# Patient Record
Sex: FEMALE | Race: WHITE | NOT HISPANIC OR LATINO
[De-identification: names, ages, dates, MRNs, and addresses within clinical notes are randomized per-mention and may not be internally consistent; named-entity substitution may affect disease eponyms.]

---

## 2018-06-25 ENCOUNTER — TRANSCRIPTION ENCOUNTER (OUTPATIENT)
Age: 33
End: 2018-06-25

## 2019-10-03 DIAGNOSIS — Z30.40 ENCOUNTER FOR SURVEILLANCE OF CONTRACEPTIVES, UNSPECIFIED: ICD-10-CM

## 2019-10-03 PROBLEM — Z00.00 ENCOUNTER FOR PREVENTIVE HEALTH EXAMINATION: Status: ACTIVE | Noted: 2019-10-03

## 2020-01-08 ENCOUNTER — RX RENEWAL (OUTPATIENT)
Age: 35
End: 2020-01-08

## 2020-01-28 ENCOUNTER — APPOINTMENT (OUTPATIENT)
Dept: OBGYN | Facility: CLINIC | Age: 35
End: 2020-01-28
Payer: COMMERCIAL

## 2020-01-28 VITALS
WEIGHT: 177.56 LBS | HEIGHT: 63 IN | DIASTOLIC BLOOD PRESSURE: 70 MMHG | BODY MASS INDEX: 31.46 KG/M2 | SYSTOLIC BLOOD PRESSURE: 120 MMHG

## 2020-01-28 DIAGNOSIS — Z82.49 FAMILY HISTORY OF ISCHEMIC HEART DISEASE AND OTHER DISEASES OF THE CIRCULATORY SYSTEM: ICD-10-CM

## 2020-01-28 DIAGNOSIS — Z78.9 OTHER SPECIFIED HEALTH STATUS: ICD-10-CM

## 2020-01-28 DIAGNOSIS — Z86.39 PERSONAL HISTORY OF OTHER ENDOCRINE, NUTRITIONAL AND METABOLIC DISEASE: ICD-10-CM

## 2020-01-28 DIAGNOSIS — Z83.3 FAMILY HISTORY OF DIABETES MELLITUS: ICD-10-CM

## 2020-01-28 DIAGNOSIS — Z01.419 ENCOUNTER FOR GYNECOLOGICAL EXAMINATION (GENERAL) (ROUTINE) W/OUT ABNORMAL FINDINGS: ICD-10-CM

## 2020-01-28 PROCEDURE — 99395 PREV VISIT EST AGE 18-39: CPT

## 2020-01-28 RX ORDER — CHROMIUM 200 MCG
TABLET ORAL
Refills: 0 | Status: ACTIVE | COMMUNITY

## 2020-01-28 RX ORDER — INSULIN ASPART 100 [IU]/ML
INJECTION, SOLUTION INTRAVENOUS; SUBCUTANEOUS
Refills: 0 | Status: ACTIVE | COMMUNITY

## 2020-01-28 NOTE — HISTORY OF PRESENT ILLNESS
[Frequency: Q ___ days] : menstrual periods occur approximately every [unfilled] days [Menarche Age: ____] : age at menarche was [unfilled] [Sexually Active] : is sexually active [Contraception] : uses contraception [Oral Contraceptives] : uses oral contraceptives [Regular Cycle Intervals] : periods have been regular

## 2020-01-28 NOTE — CHIEF COMPLAINT
[FreeTextEntry1] : The patient presents for a refill of oral contraceptives.  [Annual Visit] : annual visit

## 2020-01-29 LAB — HPV HIGH+LOW RISK DNA PNL CVX: NOT DETECTED

## 2020-02-03 LAB — CYTOLOGY CVX/VAG DOC THIN PREP: NORMAL

## 2020-06-24 RX ORDER — LEVONORGESTREL AND ETHINYL ESTRADIOL 6-5-10
50-30/75-40/ KIT ORAL DAILY
Qty: 1 | Refills: 0 | Status: COMPLETED | COMMUNITY
Start: 2019-10-03 | End: 2020-06-24

## 2020-08-30 ENCOUNTER — TRANSCRIPTION ENCOUNTER (OUTPATIENT)
Age: 35
End: 2020-08-30

## 2020-10-02 ENCOUNTER — APPOINTMENT (OUTPATIENT)
Dept: OBGYN | Facility: CLINIC | Age: 35
End: 2020-10-02
Payer: COMMERCIAL

## 2020-10-02 VITALS
DIASTOLIC BLOOD PRESSURE: 85 MMHG | HEIGHT: 55 IN | BODY MASS INDEX: 41.19 KG/M2 | SYSTOLIC BLOOD PRESSURE: 120 MMHG | WEIGHT: 178 LBS

## 2020-10-02 PROCEDURE — 99214 OFFICE O/P EST MOD 30 MIN: CPT

## 2020-10-02 RX ORDER — METFORMIN HYDROCHLORIDE 625 MG/1
TABLET ORAL
Refills: 0 | Status: DISCONTINUED | COMMUNITY
End: 2020-10-02

## 2020-10-02 NOTE — HISTORY OF PRESENT ILLNESS
[Oral Contraceptive] : uses oral contraception pills [Y] : Patient is sexually active [N] : Patient denies prior pregnancies [Regular Cycle Intervals] : periods have been regular [Frequency: Q ___ days] : menstrual periods occur approximately every [unfilled] days [Menarche Age: ____] : age at menarche was [unfilled]

## 2020-10-02 NOTE — REASON FOR VISIT
[Follow-Up] : a follow-up evaluation of [FreeTextEntry2] : contraception. The patient presents for a refill of oral contraceptives.

## 2020-10-07 RX ORDER — LEVONORGESTREL AND ETHINYL ESTRADIOL 6-5-10
50-30/75-40/ KIT ORAL DAILY
Qty: 1 | Refills: 0 | Status: COMPLETED | COMMUNITY
Start: 2020-01-28 | End: 2020-10-07

## 2020-12-09 ENCOUNTER — RX RENEWAL (OUTPATIENT)
Age: 35
End: 2020-12-09

## 2020-12-23 PROBLEM — Z01.419 ENCOUNTER FOR GYNECOLOGICAL EXAMINATION: Status: RESOLVED | Noted: 2020-01-28 | Resolved: 2020-12-23

## 2021-02-26 ENCOUNTER — RX RENEWAL (OUTPATIENT)
Age: 36
End: 2021-02-26

## 2021-05-29 ENCOUNTER — RX RENEWAL (OUTPATIENT)
Age: 36
End: 2021-05-29

## 2021-06-22 ENCOUNTER — RX CHANGE (OUTPATIENT)
Age: 36
End: 2021-06-22

## 2021-07-09 ENCOUNTER — APPOINTMENT (OUTPATIENT)
Dept: OBGYN | Facility: CLINIC | Age: 36
End: 2021-07-09
Payer: COMMERCIAL

## 2021-07-09 VITALS
HEIGHT: 62 IN | BODY MASS INDEX: 34.49 KG/M2 | SYSTOLIC BLOOD PRESSURE: 126 MMHG | WEIGHT: 187.44 LBS | DIASTOLIC BLOOD PRESSURE: 80 MMHG

## 2021-07-09 DIAGNOSIS — Z01.419 ENCOUNTER FOR GYNECOLOGICAL EXAMINATION (GENERAL) (ROUTINE) W/OUT ABNORMAL FINDINGS: ICD-10-CM

## 2021-07-09 PROCEDURE — 99395 PREV VISIT EST AGE 18-39: CPT

## 2021-07-09 PROCEDURE — 99072 ADDL SUPL MATRL&STAF TM PHE: CPT

## 2021-07-09 NOTE — HISTORY OF PRESENT ILLNESS
[Oral Contraceptive] : uses oral contraception pills [Y] : Patient is sexually active [Frequency: Q ___ days] : menstrual periods occur approximately every [unfilled] days [Menarche Age: ____] : age at menarche was [unfilled] [Regular Cycle Intervals] : periods have been regular [PGHxTotal] : 0 [PGHxFullTerm] : 0 [PGHxPremature] : 0 [PGHxAbortions] : 0 [facility] [PGHxABInduced] : 0 [PGHxABSpont] : 0 [PGHxEctopic] : 0 [PGHxMultBirths] : 0

## 2021-07-12 LAB — HPV HIGH+LOW RISK DNA PNL CVX: NOT DETECTED

## 2021-07-14 LAB — CYTOLOGY CVX/VAG DOC THIN PREP: NORMAL

## 2022-01-05 RX ORDER — LEVONORGESTREL AND ETHINYL ESTRADIOL 6-5-10
50-30/75-40/ KIT ORAL DAILY
Qty: 1 | Refills: 0 | Status: COMPLETED | COMMUNITY
Start: 2020-10-02 | End: 2022-01-05

## 2022-01-14 ENCOUNTER — APPOINTMENT (OUTPATIENT)
Dept: OBGYN | Facility: CLINIC | Age: 37
End: 2022-01-14
Payer: COMMERCIAL

## 2022-01-14 VITALS
DIASTOLIC BLOOD PRESSURE: 78 MMHG | HEIGHT: 62 IN | WEIGHT: 181.13 LBS | SYSTOLIC BLOOD PRESSURE: 128 MMHG | BODY MASS INDEX: 33.33 KG/M2

## 2022-01-14 DIAGNOSIS — Z30.41 ENCOUNTER FOR SURVEILLANCE OF CONTRACEPTIVE PILLS: ICD-10-CM

## 2022-01-14 PROCEDURE — 99213 OFFICE O/P EST LOW 20 MIN: CPT

## 2022-01-14 NOTE — HISTORY OF PRESENT ILLNESS
[Y] : Patient is sexually active [Frequency: Q ___ days] : menstrual periods occur approximately every [unfilled] days [Menarche Age: ____] : age at menarche was [unfilled] [PGHxTotal] : 0 [PGHxFullTerm] : 0 [PGHxPremature] : 0 [PGHxAbortions] : 0 [[facility] : [facility] [PGHxABInduced] : 0 [PGHxABSpont] : 0 [PGHxEctopic] : 0 [PGHxMultBirths] : 0 [Regular Cycle Intervals] : periods have been regular

## 2022-03-09 ENCOUNTER — APPOINTMENT (OUTPATIENT)
Dept: OBGYN | Facility: CLINIC | Age: 37
End: 2022-03-09
Payer: COMMERCIAL

## 2022-03-09 VITALS
DIASTOLIC BLOOD PRESSURE: 83 MMHG | BODY MASS INDEX: 33.13 KG/M2 | HEIGHT: 62 IN | WEIGHT: 180 LBS | SYSTOLIC BLOOD PRESSURE: 156 MMHG

## 2022-03-09 DIAGNOSIS — Z32.01 ENCOUNTER FOR PREGNANCY TEST, RESULT POSITIVE: ICD-10-CM

## 2022-03-09 PROCEDURE — 99213 OFFICE O/P EST LOW 20 MIN: CPT

## 2022-03-09 PROCEDURE — 81025 URINE PREGNANCY TEST: CPT

## 2022-03-10 ENCOUNTER — APPOINTMENT (OUTPATIENT)
Dept: OBGYN | Facility: CLINIC | Age: 37
End: 2022-03-10

## 2022-03-10 LAB
ABO + RH PNL BLD: NORMAL
HCG SERPL-MCNC: 26 MIU/ML
HCG UR QL: POSITIVE
QUALITY CONTROL: YES

## 2022-03-11 ENCOUNTER — APPOINTMENT (OUTPATIENT)
Dept: OBGYN | Facility: CLINIC | Age: 37
End: 2022-03-11

## 2022-03-14 RX ORDER — LEVONORGESTREL AND ETHINYL ESTRADIOL 6-5-10
50-30/75-40/ KIT ORAL
Qty: 3 | Refills: 0 | Status: DISCONTINUED | COMMUNITY
Start: 2022-01-14 | End: 2022-03-14

## 2022-03-14 RX ORDER — LEVONORGESTREL AND ETHINYL ESTRADIOL 6-5-10
50-30/75-40/ KIT ORAL DAILY
Qty: 3 | Refills: 0 | Status: DISCONTINUED | COMMUNITY
Start: 2021-07-09 | End: 2022-03-14

## 2022-03-16 ENCOUNTER — APPOINTMENT (OUTPATIENT)
Dept: OBGYN | Facility: CLINIC | Age: 37
End: 2022-03-16
Payer: COMMERCIAL

## 2022-03-16 VITALS
WEIGHT: 185.13 LBS | SYSTOLIC BLOOD PRESSURE: 120 MMHG | BODY MASS INDEX: 34.07 KG/M2 | DIASTOLIC BLOOD PRESSURE: 80 MMHG | HEIGHT: 62 IN

## 2022-03-16 DIAGNOSIS — O03.9 COMPLETE OR UNSPECIFIED SPONTANEOUS ABORTION W/OUT COMPLICATION: ICD-10-CM

## 2022-03-16 LAB — HCG SERPL-MCNC: 32 MIU/ML

## 2022-03-16 PROCEDURE — 99212 OFFICE O/P EST SF 10 MIN: CPT

## 2022-03-16 NOTE — PHYSICAL EXAM
[Awake] : awake [Alert] : alert [Acute Distress] : no acute distress [Soft] : soft [Tender] : non tender [Oriented x3] : oriented to person, place, and time [Normal] : uterus [Moderate] : there was moderate vaginal bleeding [Dilated] : the cervix was not dilated [Uterine Adnexae] : were not tender and not enlarged

## 2022-03-18 ENCOUNTER — APPOINTMENT (OUTPATIENT)
Dept: OBGYN | Facility: CLINIC | Age: 37
End: 2022-03-18

## 2022-03-18 LAB — HCG SERPL-MCNC: 79 MIU/ML

## 2022-03-24 ENCOUNTER — APPOINTMENT (OUTPATIENT)
Dept: OBGYN | Facility: CLINIC | Age: 37
End: 2022-03-24
Payer: COMMERCIAL

## 2022-03-24 VITALS
DIASTOLIC BLOOD PRESSURE: 88 MMHG | SYSTOLIC BLOOD PRESSURE: 128 MMHG | WEIGHT: 192 LBS | HEIGHT: 62 IN | BODY MASS INDEX: 35.33 KG/M2

## 2022-03-24 DIAGNOSIS — O02.81 INAPPROPRIATE CHANGE IN QUANTITATIVE HUMAN CHORIONIC GONADOTROPIN (HCG) IN EARLY PREGNANCY: ICD-10-CM

## 2022-03-24 PROCEDURE — 99213 OFFICE O/P EST LOW 20 MIN: CPT

## 2022-03-26 ENCOUNTER — TRANSCRIPTION ENCOUNTER (OUTPATIENT)
Age: 37
End: 2022-03-26

## 2022-03-29 ENCOUNTER — EMERGENCY (EMERGENCY)
Facility: HOSPITAL | Age: 37
LOS: 1 days | Discharge: HOME | End: 2022-03-29
Attending: EMERGENCY MEDICINE
Payer: COMMERCIAL

## 2022-03-29 ENCOUNTER — APPOINTMENT (OUTPATIENT)
Dept: ANTEPARTUM | Facility: CLINIC | Age: 37
End: 2022-03-29
Payer: COMMERCIAL

## 2022-03-29 ENCOUNTER — ASOB RESULT (OUTPATIENT)
Age: 37
End: 2022-03-29

## 2022-03-29 ENCOUNTER — APPOINTMENT (OUTPATIENT)
Dept: OBGYN | Facility: CLINIC | Age: 37
End: 2022-03-29
Payer: COMMERCIAL

## 2022-03-29 VITALS
SYSTOLIC BLOOD PRESSURE: 141 MMHG | RESPIRATION RATE: 16 BRPM | HEART RATE: 69 BPM | DIASTOLIC BLOOD PRESSURE: 83 MMHG | TEMPERATURE: 98 F | OXYGEN SATURATION: 97 %

## 2022-03-29 VITALS
WEIGHT: 192 LBS | HEIGHT: 62 IN | DIASTOLIC BLOOD PRESSURE: 76 MMHG | SYSTOLIC BLOOD PRESSURE: 138 MMHG | BODY MASS INDEX: 35.33 KG/M2

## 2022-03-29 VITALS
WEIGHT: 190.04 LBS | DIASTOLIC BLOOD PRESSURE: 87 MMHG | HEIGHT: 62 IN | OXYGEN SATURATION: 99 % | SYSTOLIC BLOOD PRESSURE: 151 MMHG | TEMPERATURE: 98 F | RESPIRATION RATE: 16 BRPM | HEART RATE: 73 BPM

## 2022-03-29 DIAGNOSIS — O00.90 UNSPECIFIED. ECTOPIC. PREGNANCY WITHOUT INTRAUTERINE PREGNANCY: ICD-10-CM

## 2022-03-29 LAB
ALBUMIN SERPL ELPH-MCNC: 4.4 G/DL — SIGNIFICANT CHANGE UP (ref 3.3–5.2)
ALP SERPL-CCNC: 89 U/L — SIGNIFICANT CHANGE UP (ref 40–120)
ALT FLD-CCNC: 15 U/L — SIGNIFICANT CHANGE UP
ANION GAP SERPL CALC-SCNC: 14 MMOL/L — SIGNIFICANT CHANGE UP (ref 5–17)
AST SERPL-CCNC: 18 U/L — SIGNIFICANT CHANGE UP
BASOPHILS # BLD AUTO: 0.06 K/UL — SIGNIFICANT CHANGE UP (ref 0–0.2)
BASOPHILS NFR BLD AUTO: 0.5 % — SIGNIFICANT CHANGE UP (ref 0–2)
BILIRUB SERPL-MCNC: 0.4 MG/DL — SIGNIFICANT CHANGE UP (ref 0.4–2)
BLD GP AB SCN SERPL QL: SIGNIFICANT CHANGE UP
BUN SERPL-MCNC: 9.7 MG/DL — SIGNIFICANT CHANGE UP (ref 8–20)
CALCIUM SERPL-MCNC: 9 MG/DL — SIGNIFICANT CHANGE UP (ref 8.6–10.2)
CHLORIDE SERPL-SCNC: 104 MMOL/L — SIGNIFICANT CHANGE UP (ref 98–107)
CO2 SERPL-SCNC: 22 MMOL/L — SIGNIFICANT CHANGE UP (ref 22–29)
CREAT SERPL-MCNC: 0.73 MG/DL — SIGNIFICANT CHANGE UP (ref 0.5–1.3)
EGFR: 110 ML/MIN/1.73M2 — SIGNIFICANT CHANGE UP
EOSINOPHIL # BLD AUTO: 0.37 K/UL — SIGNIFICANT CHANGE UP (ref 0–0.5)
EOSINOPHIL NFR BLD AUTO: 3.3 % — SIGNIFICANT CHANGE UP (ref 0–6)
GLUCOSE SERPL-MCNC: 133 MG/DL — HIGH (ref 70–99)
HCG SERPL-ACNC: 851.6 MIU/ML — HIGH
HCG SERPL-MCNC: 411 MIU/ML
HCG SERPL-MCNC: 601 MIU/ML
HCT VFR BLD CALC: 40.6 % — SIGNIFICANT CHANGE UP (ref 34.5–45)
HGB BLD-MCNC: 13.6 G/DL — SIGNIFICANT CHANGE UP (ref 11.5–15.5)
IMM GRANULOCYTES NFR BLD AUTO: 0.5 % — SIGNIFICANT CHANGE UP (ref 0–1.5)
LYMPHOCYTES # BLD AUTO: 3.4 K/UL — HIGH (ref 1–3.3)
LYMPHOCYTES # BLD AUTO: 30.6 % — SIGNIFICANT CHANGE UP (ref 13–44)
MCHC RBC-ENTMCNC: 29.9 PG — SIGNIFICANT CHANGE UP (ref 27–34)
MCHC RBC-ENTMCNC: 33.5 GM/DL — SIGNIFICANT CHANGE UP (ref 32–36)
MCV RBC AUTO: 89.2 FL — SIGNIFICANT CHANGE UP (ref 80–100)
MONOCYTES # BLD AUTO: 1.04 K/UL — HIGH (ref 0–0.9)
MONOCYTES NFR BLD AUTO: 9.4 % — SIGNIFICANT CHANGE UP (ref 2–14)
NEUTROPHILS # BLD AUTO: 6.18 K/UL — SIGNIFICANT CHANGE UP (ref 1.8–7.4)
NEUTROPHILS NFR BLD AUTO: 55.7 % — SIGNIFICANT CHANGE UP (ref 43–77)
PLATELET # BLD AUTO: 236 K/UL — SIGNIFICANT CHANGE UP (ref 150–400)
POTASSIUM SERPL-MCNC: 4.2 MMOL/L — SIGNIFICANT CHANGE UP (ref 3.5–5.3)
POTASSIUM SERPL-SCNC: 4.2 MMOL/L — SIGNIFICANT CHANGE UP (ref 3.5–5.3)
PROT SERPL-MCNC: 7.1 G/DL — SIGNIFICANT CHANGE UP (ref 6.6–8.7)
RBC # BLD: 4.55 M/UL — SIGNIFICANT CHANGE UP (ref 3.8–5.2)
RBC # FLD: 12 % — SIGNIFICANT CHANGE UP (ref 10.3–14.5)
SODIUM SERPL-SCNC: 139 MMOL/L — SIGNIFICANT CHANGE UP (ref 135–145)
WBC # BLD: 11.11 K/UL — HIGH (ref 3.8–10.5)
WBC # FLD AUTO: 11.11 K/UL — HIGH (ref 3.8–10.5)

## 2022-03-29 PROCEDURE — 76817 TRANSVAGINAL US OBSTETRIC: CPT

## 2022-03-29 PROCEDURE — 76801 OB US < 14 WKS SINGLE FETUS: CPT

## 2022-03-29 PROCEDURE — 86850 RBC ANTIBODY SCREEN: CPT

## 2022-03-29 PROCEDURE — 85025 COMPLETE CBC W/AUTO DIFF WBC: CPT

## 2022-03-29 PROCEDURE — 99285 EMERGENCY DEPT VISIT HI MDM: CPT

## 2022-03-29 PROCEDURE — 76801 OB US < 14 WKS SINGLE FETUS: CPT | Mod: 26

## 2022-03-29 PROCEDURE — 99284 EMERGENCY DEPT VISIT MOD MDM: CPT | Mod: 25

## 2022-03-29 PROCEDURE — 99213 OFFICE O/P EST LOW 20 MIN: CPT

## 2022-03-29 PROCEDURE — 86900 BLOOD TYPING SEROLOGIC ABO: CPT

## 2022-03-29 PROCEDURE — 76817 TRANSVAGINAL US OBSTETRIC: CPT | Mod: 26

## 2022-03-29 PROCEDURE — 36415 COLL VENOUS BLD VENIPUNCTURE: CPT

## 2022-03-29 PROCEDURE — 84702 CHORIONIC GONADOTROPIN TEST: CPT

## 2022-03-29 PROCEDURE — 86901 BLOOD TYPING SEROLOGIC RH(D): CPT

## 2022-03-29 PROCEDURE — 96372 THER/PROPH/DIAG INJ SC/IM: CPT

## 2022-03-29 PROCEDURE — 80053 COMPREHEN METABOLIC PANEL: CPT

## 2022-03-29 RX ORDER — METHOTREXATE 2.5 MG/1
92 TABLET ORAL ONCE
Refills: 0 | Status: COMPLETED | OUTPATIENT
Start: 2022-03-29 | End: 2022-03-29

## 2022-03-29 RX ORDER — METHOTREXATE 2.5 MG/1
94 TABLET ORAL ONCE
Refills: 0 | Status: DISCONTINUED | OUTPATIENT
Start: 2022-03-29 | End: 2022-03-29

## 2022-03-29 RX ADMIN — METHOTREXATE 92 MILLIGRAM(S): 2.5 TABLET ORAL at 22:44

## 2022-03-29 NOTE — ED ADULT NURSE NOTE - OBJECTIVE STATEMENT
Patient A&Ox4 sent to ED by OBGYN for ectopic pregnancy. Reports vaginal bleeding x 1 week.  Pt 7 weeks pregnant, requesting Methotrexate.  NAD noted, respirations even and unlabored.  Safety precautions in place.  Plan of care explained, pt verbalized understanding.

## 2022-03-29 NOTE — ED STATDOCS - NSFOLLOWUPINSTRUCTIONS_ED_ALL_ED_FT
please follow up with obgyn outpatient      [facility]uese[facility]SpanishTagalog[facility]namese                                                                                                                           Ectopic Pregnancy       An ectopic pregnancy happens when a fertilized egg attaches (implants) outside the uterus. In a normal pregnancy, a fertilized egg implants in the uterus. An ectopic pregnancy cannot develop into a healthy baby. Most ectopic pregnancies occur in one of the fallopian tubes, which is where an egg travels from an ovary to get to the uterus. This is called a tubal pregnancy. An ectopic pregnancy can also happen on an ovary, on the cervix, or in the abdomen.    When a fertilized egg implants on tissue outside the uterus and begins to grow, it may cause the tissue to tear or burst. This is known as a ruptured ectopic pregnancy. The tear or burst causes internal bleeding. This may cause intense pain in the abdomen. An ectopic pregnancy is a medical emergency and can be life-threatening.      What are the causes?    The most common cause of this condition is damage to one of the fallopian tubes. A fallopian tube may be narrowed or blocked, and that stops the fertilized egg from reaching the uterus.    Sometimes, the cause of this condition is not known.      What increases the risk?    The following factors may make you more likely to develop this condition:  •Having gone through infertility treatment before.      •Having had an ectopic pregnancy before.      •Having had surgery to have the fallopian tubes tied.      •Becoming pregnant while using an intrauterine device for birth control.      •Taking birth control pills before the age of 16.      Other risk factors include:  •Smoking.      •Alcohol use.      •History of FERNANDO exposure. FERNANDO is a medicine that was used until 1971 and affected babies whose mothers took the medicine.        What are the signs or symptoms?    Common symptoms of this condition include:  •Missing a menstrual period.      •Nausea or tiredness.      •Tender breasts.      •Other normal pregnancy symptoms.      Other symptoms may include:  •Pain during sex.      •Vaginal bleeding or spotting.      •Cramping or pain in the lower abdomen.      •A fast heartbeat, low blood pressure, and sweating.      •Pain or increased pressure while having a bowel movement.      Symptoms of a ruptured ectopic pregnancy and internal bleeding may include:  •Sudden, severe pain in the abdomen.      •Dizziness, weakness, feeling light-headed, or fainting.      •Pain in the shoulder or neck area.        How is this diagnosed?    This condition is diagnosed by:  •A blood test to check for the pregnancy hormone.      •A pelvic exam to find painful areas or a mass in the abdomen.      •Ultrasound. A probe is inserted into the vagina to see if there is a pregnancy in or outside the uterus.      •Taking a sample of tissue from the uterus.      •Surgery to look closely at the fallopian tubes through an incision in the abdomen.        How is this treated?    This condition is usually treated with medicine or surgery. Sometimes, ectopic pregnancies can resolve on their own, under close monitoring by your health care provider.    Medicine     A medicine called methotrexate may be given to cause the pregnancy tissue to be absorbed. The medicine may be given if:  •The diagnosis is made early, with no signs of active bleeding.      •The fallopian tube has not torn or burst.      You will need blood tests to make sure the medicine is working. It may take 4–6 weeks for the pregnancy tissues to be absorbed.    Surgery    Surgery may be performed to:  •Remove the pregnancy tissue.      •Stop internal bleeding.      •Remove part or all of the fallopian tube.      •Remove the uterus. This is rare.      After surgery, you may need to have blood tests to make sure the surgery worked.      Follow these instructions at home:    Medicines     •Take over-the-counter and prescription medicines only as told by your health care provider.    •Ask your health care provider if the medicine prescribed to you:  •Requires you to avoid driving or using machinery.    •Can cause constipation. You may need to take these actions to prevent or treat constipation:  •Drink enough fluid to keep your urine pale yellow.      •Take over-the-counter or prescription medicines.      •Eat foods that are high in fiber, such as beans, whole grains, and fresh fruits and vegetables.      •Limit foods that are high in fat and processed sugars, such as fried or sweet foods.          General instructions     •Rest or limit your activity, if told by your health care provider.      • Do not have sex or put anything in your vagina, such as tampons or douches, for 6 weeks or until your health care provider says it is safe.      • Do not lift anything that is heavier than 10 lb (4.5 kg), or the limit that you are told, until your health care provider says that it is safe.      •Return to your normal activities as told by your health care provider. Ask your health care provider what activities are safe for you.      •Keep all follow-up visits. This is important.        Contact a health care provider if:    •You have a fever or chills.      •You have nausea and vomiting.        Get help right away if:    •Your pain gets worse or is not relieved by medicine.      •You feel dizzy or weak.      •You feel light-headed or you faint.      •You have sudden, severe pain in your abdomen.      •You have sudden pain in the shoulder or neck area.        Summary    •An ectopic pregnancy happens when a fertilized egg implants outside the uterus. Most ectopic pregnancies occur in one of the fallopian tubes.      •An ectopic pregnancy is a medical emergency and can be life-threatening.      •The most common cause of this condition is damage to one of the fallopian tubes.      •This condition is usually treated with medicine or surgery. Some ectopic pregnancies resolve on their own, under close monitoring by your health care provider.      This information is not intended to replace advice given to you by your health care provider. Make sure you discuss any questions you have with your health care provider.      Document Revised: 03/30/2021 Document Reviewed: 03/30/2021    Elsevier Patient Education © 2022 [employer] Inc[employer]

## 2022-03-29 NOTE — CONSULT NOTE ADULT - SUBJECTIVE AND OBJECTIVE BOX
Patient is a 37yo  at 7w4d (LMP 22)  Patient is a 35yo  at 7w4d (LMP 22) presenting today from her doctor's office for tx of ectopic pregnancy. Patient has been seeing Dr. Lynch for follow up bHCG over the last few weeks. bHCGs have been 26 > 32 > 79 > 411 > 601 from 3/9 to 3/26. She went to the office today for an US and an adnexal mass was noted. She was sent to the ED for mgmt.    Obhx: none  Medhx: T1DM  Surghx: orthopedic  Meds: Insulin  All: NKDA    Vital Signs Last 24 Hrs  T(C): 36.9 (29 Mar 2022 17:08), Max: 36.9 (29 Mar 2022 17:08)  T(F): 98.4 (29 Mar 2022 17:08), Max: 98.4 (29 Mar 2022 17:08)  HR: 73 (29 Mar 2022 17:08) (73 - 73)  BP: 151/87 (29 Mar 2022 17:08) (151/87 - 151/87)  BP(mean): --  RR: 16 (29 Mar 2022 17:08) (16 - 16)  SpO2: 99% (29 Mar 2022 17:08) (99% - 99%)    Physical Exam  General: Alert and oriented x3, NAD  Heart: RRR  Lungs: CTAB  Abd: Soft, nontender, nondistended, no rebound or guarding                          13.6   11.11 )-----------( 236      ( 29 Mar 2022 19:21 )             40.6           139  |  104  |  9.7  ----------------------------<  133<H>  4.2   |  22.0  |  0.73    Ca    9.0      29 Mar 2022 19:21    TPro  7.1  /  Alb  4.4  /  TBili  0.4  /  DBili  x   /  AST  18  /  ALT  15  /  AlkPhos  89      ABO RH Interpretation: O POS (22 @ 19:26)    HCG Quantitative, Serum: 851.6    < from: US Transvaginal, OB (22 @ 20:40) >  IMPRESSION:  Findings suspicious for right adnexal ectopic pregnancy.    No intrauterine pregnancy is identified.    Scattered small uterine uterine myomas.

## 2022-03-29 NOTE — PLAN
[FreeTextEntry1] : Treatment options were discussed with the patient.  The patient was sent to the emergency room for methotrexate. Risks, benefits and alternatives were discussed with the patient.\par

## 2022-03-29 NOTE — ED STATDOCS - PROGRESS NOTE DETAILS
reviewed ultrasound results lab work obgyn called  obgyn will give methotrexate to patient consent obtained

## 2022-03-29 NOTE — ED STATDOCS - PATIENT PORTAL LINK FT
You can access the FollowMyHealth Patient Portal offered by [facility] by registering at the following website: [email or web address]. By joining [employer]’s FollowMyHealth portal, you will also be able to view your health information using other applications (apps) compatible with our system.

## 2022-03-29 NOTE — ED ADULT NURSE NOTE - NSSEPSISSUSPECTED_ED_A_ED
No Detail Level: Detailed Quality 431: Preventive Care And Screening: Unhealthy Alcohol Use - Screening: Patient screened for unhealthy alcohol use using a single question and scores less than 2 times per year Quality 110: Preventive Care And Screening: Influenza Immunization: Influenza Immunization Administered during Influenza season

## 2022-03-29 NOTE — ED STATDOCS - OBJECTIVE STATEMENT
37 y/o female with no PMHx presents to ED c/o pregnancy problem. Patient A0 @ 7 weeks reports she is having an ectopic pregnancy, was sent to the ED by her OBGYN. Reports vaginal bleeding that began 1 week ago. OBGYN wants patient to have Methotrexate.     Denies abdominal pain  OBGYN: Dr. Delgado

## 2022-03-29 NOTE — ED STATDOCS - ATTENDING CONTRIBUTION TO CARE
I, Dr. Guallpa, performed the initial face to face bedside interview with this patient regarding history of present illness, review of symptoms and relevant past medical, social and family history.  I completed an independent physical examination.  I was the initial provider who evaluated this patient. I have signed out the follow up of any pending tests (i.e. labs, radiological studies) to the ACP.  I have communicated the patient’s plan of care and disposition with the ACP.

## 2022-03-29 NOTE — ED STATDOCS - NS ED ATTENDING STATEMENT MOD
This was a shared visit with the THAO. I reviewed and verified the documentation and independently performed the documented:

## 2022-03-29 NOTE — CONSULT NOTE ADULT - ASSESSMENT
Patient is a 37yo  at 7w4d (LMP 22) presenting today from her doctor's office for tx of ectopic pregnancy. Patient is a 37yo  at 7w4d (LMP 22) presenting today from her doctor's office for tx of ectopic pregnancy.  - Vital signs stable  - Labs wnl except for bHCG 851.   - Trend of HCG in the setting of adnexal mass noted in office and ER highly suspicious for ectopic pregnancy  - Treatment options discussed with pts including expectant mgmt, medical mgmt, surgical mgmt  - Pt electing for medical mgmt  - MTX to be given  - She will f/u in the office with Dr. Lynch in 4 days and 7 days    Plan D/w Dr. Lynch

## 2022-03-29 NOTE — ED STATDOCS - CLINICAL SUMMARY MEDICAL DECISION MAKING FREE TEXT BOX
Patient 7 weeks pregnant, sent in by OBGYN concerning for ectopic pregnancy. Will contact OB, order labs, US, and re-assess.

## 2022-03-30 ENCOUNTER — APPOINTMENT (OUTPATIENT)
Dept: OBGYN | Facility: CLINIC | Age: 37
End: 2022-03-30

## 2022-04-01 ENCOUNTER — APPOINTMENT (OUTPATIENT)
Dept: OBGYN | Facility: CLINIC | Age: 37
End: 2022-04-01

## 2022-04-02 LAB — HCG SERPL-MCNC: 767 MIU/ML

## 2022-04-05 ENCOUNTER — APPOINTMENT (OUTPATIENT)
Dept: OBGYN | Facility: CLINIC | Age: 37
End: 2022-04-05

## 2022-04-06 ENCOUNTER — NON-APPOINTMENT (OUTPATIENT)
Age: 37
End: 2022-04-06

## 2022-04-06 ENCOUNTER — EMERGENCY (EMERGENCY)
Facility: HOSPITAL | Age: 37
LOS: 1 days | Discharge: HOME | End: 2022-04-06
Attending: EMERGENCY MEDICINE
Payer: COMMERCIAL

## 2022-04-06 VITALS
RESPIRATION RATE: 20 BRPM | SYSTOLIC BLOOD PRESSURE: 123 MMHG | HEIGHT: 62 IN | WEIGHT: 179.9 LBS | DIASTOLIC BLOOD PRESSURE: 84 MMHG | OXYGEN SATURATION: 98 % | TEMPERATURE: 99 F | HEART RATE: 71 BPM

## 2022-04-06 LAB — HCG SERPL-MCNC: 723 MIU/ML

## 2022-04-06 PROCEDURE — 99283 EMERGENCY DEPT VISIT LOW MDM: CPT

## 2022-04-06 PROCEDURE — 99284 EMERGENCY DEPT VISIT MOD MDM: CPT

## 2022-04-06 RX ORDER — METHOTREXATE 2.5 MG/1
92 TABLET ORAL ONCE
Refills: 0 | Status: COMPLETED | OUTPATIENT
Start: 2022-04-06 | End: 2022-04-06

## 2022-04-06 RX ORDER — METHOTREXATE 2.5 MG/1
92 TABLET ORAL ONCE
Refills: 0 | Status: DISCONTINUED | OUTPATIENT
Start: 2022-04-06 | End: 2022-04-06

## 2022-04-06 RX ADMIN — METHOTREXATE 92 MILLIGRAM(S): 2.5 TABLET ORAL at 18:17

## 2022-04-06 NOTE — ED PROVIDER NOTE - PHYSICAL EXAMINATION
Const: AOX3 nontoxic appearing, no apparent respiratory or physical distress. Stable gait   HEENT: NC/AT. Moist mucous membranes.  Eyes: JAZIEL. EOMI  Neck: Soft and supple. Full ROM without pain.  Cardiac: Regular rate and regular rhythm. +S1/S2  Resp: Speaking in full sentences. No evidence of respiratory distress.  Abd: Soft, non-tender, non-distended. Normal bowel sounds in all 4 quadrants. No guarding or rebound.  Back: Spine midline and non-tender. No CVAT.  Neuro: Awake, alert & oriented x 3. Moves all extremities symmetrically.

## 2022-04-06 NOTE — ED PROVIDER NOTE - NSFOLLOWUPINSTRUCTIONS_ED_ALL_ED_FT
please follow up with  Dr. Lynch in 4d for repeat [facility]    Ectopic Pregnancy       An ectopic pregnancy happens when a fertilized egg grows outside of the womb (uterus). Fertilized means that sperm entered the egg. The egg cannot stay alive outside of the womb.      What are the causes?    The most common cause is damage to a fallopian tube. This damage stops the egg from getting to the womb. Instead, the egg stays in the tube.    Sometimes, an ectopic pregnancy happens in other parts of the body.      What increases the risk?    •Getting treatment before to help you have a baby.      •A past pregnancy outside of the womb.      •A past surgery to have your tubes tied.      •Getting pregnant while using a device in the womb to avoid getting pregnant.      •Taking birth control pills before the age of 16.      •Smoking or drinking alcohol.      •Having a mother who took a medicine called FERNANDO many years ago.        What are the signs or symptoms?    Common symptoms of this condition include:  •Missing a menstrual period.      •Feeling like you may vomit.      •Tiredness.      •Breast pain.      •Other signs that you are pregnant.      Other symptoms may include:  •Pain during sex.      •Bleeding from the vagina.      •Belly pain.      •A fast heartbeat, low blood pressure, and sweating.      •Pain or extra pressure while pooping (having a bowel movement).      If your tube tears or bursts:  •You may have sudden and very bad pain in your belly.      •You may feel dizzy, weak, or light-headed.      •You may faint.      •You may have pain in your shoulder or neck.      A torn or burst tube is an emergency. It can be life-threatening.      How is this treated?    This condition may be treated with:•Medicine. This may be given if:  •The pregnancy is found early and you are not bleeding.      •The tube has not torn or burst.      •Surgery. This may be done to:  •Take out the pregnancy tissue.      •Stop bleeding.      •Take out part or all of the tube.      •Take out the womb. This is rare.        •Blood tests.      •Careful watching.        Follow these instructions at home:    Medicines     •Take over-the-counter and prescription medicines only as told by your doctor.    •If told, take steps to prevent problems with pooping (constipation). You may need to:  •Drink enough fluid to keep your pee (urine) pale yellow.      •Take medicines. You will be told what medicines to take.      •Eat foods that are high in fiber. These include beans, whole grains, and fresh fruits and vegetables.      •Limit foods that are high in fat and sugar. These include fried or sweet foods.        •Ask your doctor if you should avoid driving or using machines while you are taking your medicine.      General instructions     •Rest or limit your activities, if told to do this.      • Do not have sex for 6 weeks or as told by your doctor.      • Do not put tampons, vaginal cleaning wash (douche), or other things in your vagina. Do not use these things for 6 weeks or until your doctor says it is safe to use them.      • Do not lift anything that is heavier than 10 lb (4.5 kg), or the limit that you are told.      •Return to your normal activities when your doctor says that it is safe.      •Keep all follow-up visits.        Contact a doctor if:    •You have a fever or chills.      •You feel like you may vomit and you vomit.        Get help right away if:    •Your pain gets worse or is not helped by medicine.      •You feel dizzy or weak.      •You feel light-headed.      •You faint.      •You have sudden and very bad pain in your belly.      •You have very bad pain in your shoulder or neck.        Summary    •An ectopic pregnancy happens when a fertilized egg grows outside the womb.      •This is an emergency.      •The most common cause is damage to one of the fallopian tubes.      •This condition may be treated with medicine, surgery, blood tests, or careful watching.      This information is not intended to replace advice given to you by your health care provider. Make sure you discuss any questions you have with your health care provider.

## 2022-04-06 NOTE — ED PROVIDER NOTE - CLINICAL SUMMARY MEDICAL DECISION MAKING FREE TEXT BOX
will repeat the labs and HCG level and US OB and will re evaluate   consider consult GYN after result

## 2022-04-06 NOTE — ED ADULT TRIAGE NOTE - CHIEF COMPLAINT QUOTE
Pt arrives to ED stating " I was sent by DR. Nicholas for repeat blood work . I was given methotrexate  last week and my numbers did not go down "

## 2022-04-06 NOTE — CONSULT NOTE ADULT - ASSESSMENT
36y  @ 8w4d by LMP 22 with history of ectopic pregnancy s/p methotrexate 1w ago here for an additional dose of methotrexate. Abd soft, VSS.  Plan:  Additional dose of Methotrexate ordered    F/U with Dr. Lynch in 4d for repeat BHCG    Dr. Lynch obtained consents at bedside  Cleared for DC from GYN standpoint after MTX given  Ectopic precautions reviewed

## 2022-04-06 NOTE — ED PROVIDER NOTE - ATTENDING CONTRIBUTION TO CARE
37 yo  at LMP 22 s/p treatment with methotrexate last week for ectopic pregnancy referred by NP (Dr. Lynch's office)from GYN for further evaluation because it was felt that BHCG levels were not decreasing fast enough.  pt admits to some mild pelvic pain and vaginal bleeding.  No assoc fever or chills,  Case d/w GYN/Dr. Remy and requesting repeat labs and Type and Screen

## 2022-04-06 NOTE — ED PROVIDER NOTE - OBJECTIVE STATEMENT
37yo  at LMP 22  sent by Dr Lynch to ER to repeat pregnancy level . PTT is been Dx With ectopic pregnancy last week states she received to injection of methotrexate . then last Friday she had repeat the HCG was 766 and yesterday was about 740 that is been called to go to ER by NP from the office.  pt states she has minimal vaginal bleeding and minimal pain on b/L lower pelvic . denies any fever or chills or lightheadedness

## 2022-04-06 NOTE — ED PROVIDER NOTE - PATIENT PORTAL LINK FT
You can access the FollowMyHealth Patient Portal offered by [facility] by registering at the following website: [email or web address]. By joining [employer]’s FollowMyHealth portal, you will also be able to view your health information using other applications (apps) compatible with our system.

## 2022-04-06 NOTE — ED PROVIDER NOTE - CARE PROVIDER_API CALL
Jaya Lynch (DO)  Obstetrics and Gynecology  [address], [address] [place] [address]  Phone: [phone]  Fax: [phone]  Follow Up Time:

## 2022-04-06 NOTE — CONSULT NOTE ADULT - SUBJECTIVE AND OBJECTIVE BOX
Name: SARA HERNANDEZ  MRN: [id number]  Allergies:     SARA HERNANDEZ is a 36y  @ 8w4d by LMP 22 with history of ectopic pregnancy s/p methotrexate 1w ago sent to ED by MD for repeat methotrexate injection. Patient was seen at [facility] 1wk ago and diagnosed with an ectopic pregnancy. Day 4 BHCG was 767 and Day 7 (today) was 723. Due to inappropriate drop in BHCG, patient advised to come to ED for additional dose of Methotrexate.   Currently, patient has no complaints. No abdominal pain, headache, dizziness, CP, SOB, or N/V.     PAST GYN HISTORY: Denies history of abnormal paps, STDs, ovarian cysts, or uterine fibroids.   PAST MEDICAL HISTORY: T1DM  PAST SURGICAL HISTORY: orthopedic  Home Medications: none  NKDA  Denies tobacco, alcohol or drug use. Feels safe at home    HOSPITAL MEDS:  methotrexate Injectable (Non - oncologic) 92 milliGRAM(s) IntraMuscular once    Vital Signs Last 24 Hrs  T(C): 37.1 (2022 15:07), Max: 37.1 (2022 15:07)  T(F): 98.8 (2022 15:07), Max: 98.8 (2022 15:07)  HR: 71 (2022 15:07) (71 - 71)  BP: 123/84 (2022 15:07) (123/84 - 123/84)  RR: 20 (2022 15:07) (20 - 20)  SpO2: 98% (2022 15:07) (98% - 98%)    PHYSICAL EXAM:  GEN: NAD, AOx3  Lung:  Speaking in full sentences without shortness of breath.  Abd: Soft, Nontender, Nondistended. No rebound tenderness or guarding.   PELVIC: deferred    LABS:  see HIE

## 2022-04-06 NOTE — ED PROVIDER NOTE - PROGRESS NOTE DETAILS
as per Er and GYN attending pt dose not need any more lab work . methotrexate is given .  f.u in 4 days

## 2022-04-09 DIAGNOSIS — R55 SYNCOPE AND COLLAPSE: ICD-10-CM

## 2022-04-11 ENCOUNTER — APPOINTMENT (OUTPATIENT)
Dept: OBGYN | Facility: CLINIC | Age: 37
End: 2022-04-11

## 2022-04-13 ENCOUNTER — NON-APPOINTMENT (OUTPATIENT)
Age: 37
End: 2022-04-13

## 2022-04-18 LAB — HCG SERPL-MCNC: 144 MIU/ML

## 2022-04-25 LAB
HCG SERPL-MCNC: 38 MIU/ML
HCG SERPL-MCNC: 387 MIU/ML

## 2022-05-02 LAB — HCG SERPL-MCNC: 11 MIU/ML

## 2022-05-09 LAB — HCG SERPL-MCNC: <1 MIU/ML

## 2022-11-03 NOTE — ED STATDOCS - GENITOURINARY [+], MLM
vaginal bleeding/possible ectopic Abdomen soft, non-tender and non-distended, no rebound, no guarding and no masses. no hepatosplenomegaly.

## 2022-11-23 ENCOUNTER — NON-APPOINTMENT (OUTPATIENT)
Age: 37
End: 2022-11-23

## 2023-12-02 ENCOUNTER — OFFICE (OUTPATIENT)
Dept: URBAN - METROPOLITAN AREA CLINIC 115 | Facility: CLINIC | Age: 38
Setting detail: OPHTHALMOLOGY
End: 2023-12-02
Payer: COMMERCIAL

## 2023-12-02 DIAGNOSIS — H43.391: ICD-10-CM

## 2023-12-02 DIAGNOSIS — Z79.899: ICD-10-CM

## 2023-12-02 DIAGNOSIS — E10.3293: ICD-10-CM

## 2023-12-02 DIAGNOSIS — L93.0: ICD-10-CM

## 2023-12-02 DIAGNOSIS — H01.005: ICD-10-CM

## 2023-12-02 DIAGNOSIS — H01.002: ICD-10-CM

## 2023-12-02 PROCEDURE — 92083 EXTENDED VISUAL FIELD XM: CPT | Performed by: OPHTHALMOLOGY

## 2023-12-02 PROCEDURE — 92014 COMPRE OPH EXAM EST PT 1/>: CPT | Performed by: OPHTHALMOLOGY

## 2023-12-02 PROCEDURE — 92134 CPTRZ OPH DX IMG PST SGM RTA: CPT | Performed by: OPHTHALMOLOGY

## 2023-12-02 ASSESSMENT — LID EXAM ASSESSMENTS
OS_BLEPHARITIS: LLL T
OD_BLEPHARITIS: RLL T

## 2023-12-02 ASSESSMENT — REFRACTION_AUTOREFRACTION
OD_AXIS: 116
OS_AXIS: 068
OS_CYLINDER: -0.50
OD_SPHERE: 0.00
OD_CYLINDER: -0.25
OS_SPHERE: 0.00

## 2023-12-02 ASSESSMENT — SPHEQUIV_DERIVED
OS_SPHEQUIV: -0.25
OD_SPHEQUIV: -0.125

## 2023-12-02 ASSESSMENT — CONFRONTATIONAL VISUAL FIELD TEST (CVF)
OS_FINDINGS: FULL
OD_FINDINGS: FULL

## 2024-01-19 ENCOUNTER — APPOINTMENT (OUTPATIENT)
Dept: OBGYN | Facility: CLINIC | Age: 39
End: 2024-01-19
Payer: COMMERCIAL

## 2024-01-19 VITALS
WEIGHT: 190 LBS | SYSTOLIC BLOOD PRESSURE: 120 MMHG | DIASTOLIC BLOOD PRESSURE: 78 MMHG | BODY MASS INDEX: 34.96 KG/M2 | HEIGHT: 62 IN

## 2024-01-19 PROCEDURE — 56405 I&D VULVA/PERINEAL ABSCESS: CPT

## 2024-01-19 NOTE — PROCEDURE
[I & D] : I&D [Time out performed] : Pre-procedure time out performed.  Patient's name, date of birth and procedure confirmed [Consent obtained] : Consent obtained [Culture sent] : culture sent [Right] : right [____% Lidocaine w/Epi] : [unfilled]% Lidocaine with Epi [Purulent Fluid] : purulent fluid [Tolerated Well] : The patient tolerated the procedure well [No Complications] : There were no complications [de-identified] : Labia minora absecess

## 2024-01-19 NOTE — PLAN
[FreeTextEntry1] : Culture obtained  RX sent to pharmacy  Fluconazole sent due to ABX   F/U for annual examination  All questions and concerns addressed during encounter. Pt. agreed to plan of care.

## 2024-01-26 LAB — BACTERIA SPEC CULT: ABNORMAL

## 2024-02-14 ENCOUNTER — TRANSCRIPTION ENCOUNTER (OUTPATIENT)
Age: 39
End: 2024-02-14

## 2024-02-15 ENCOUNTER — TRANSCRIPTION ENCOUNTER (OUTPATIENT)
Age: 39
End: 2024-02-15

## 2024-02-15 ENCOUNTER — APPOINTMENT (OUTPATIENT)
Dept: OBGYN | Facility: CLINIC | Age: 39
End: 2024-02-15
Payer: COMMERCIAL

## 2024-02-15 VITALS
WEIGHT: 185 LBS | DIASTOLIC BLOOD PRESSURE: 84 MMHG | SYSTOLIC BLOOD PRESSURE: 136 MMHG | BODY MASS INDEX: 34.04 KG/M2 | HEIGHT: 62 IN

## 2024-02-15 PROCEDURE — 56405 I&D VULVA/PERINEAL ABSCESS: CPT

## 2024-02-15 NOTE — PROCEDURE
[I & D] : I&D [Time out performed] : Pre-procedure time out performed.  Patient's name, date of birth and procedure confirmed [Consent obtained] : Consent obtained [Right] : right [Purulent Fluid] : purulent fluid [Tolerated Well] : The patient tolerated the procedure well [No Complications] : There were no complications [de-identified] : labia minora [de-identified] : lidocaine

## 2024-02-20 ENCOUNTER — APPOINTMENT (OUTPATIENT)
Dept: OBGYN | Facility: CLINIC | Age: 39
End: 2024-02-20
Payer: COMMERCIAL

## 2024-02-20 VITALS
SYSTOLIC BLOOD PRESSURE: 120 MMHG | BODY MASS INDEX: 35.11 KG/M2 | DIASTOLIC BLOOD PRESSURE: 82 MMHG | WEIGHT: 190.8 LBS | HEIGHT: 62 IN

## 2024-02-20 DIAGNOSIS — N76.4 ABSCESS OF VULVA: ICD-10-CM

## 2024-02-20 PROCEDURE — 56405 I&D VULVA/PERINEAL ABSCESS: CPT

## 2024-02-20 NOTE — PROCEDURE
[I & D] : I&D [Right] : right [No Premedication] : no premedication [Purulent Fluid] : purulent fluid [Tolerated Well] : The patient tolerated the procedure well [No Complications] : There were no complications

## 2024-02-20 NOTE — PLAN
[FreeTextEntry1] : F/U in 1 week  All questions and concerns addressed during encounter. Pt. agreed to plan of care.

## 2024-03-08 ENCOUNTER — APPOINTMENT (OUTPATIENT)
Dept: OBGYN | Facility: CLINIC | Age: 39
End: 2024-03-08
Payer: COMMERCIAL

## 2024-03-08 VITALS
WEIGHT: 188 LBS | BODY MASS INDEX: 34.6 KG/M2 | HEIGHT: 62 IN | DIASTOLIC BLOOD PRESSURE: 80 MMHG | SYSTOLIC BLOOD PRESSURE: 120 MMHG

## 2024-03-08 DIAGNOSIS — Z01.411 ENCOUNTER FOR GYNECOLOGICAL EXAMINATION (GENERAL) (ROUTINE) WITH ABNORMAL FINDINGS: ICD-10-CM

## 2024-03-08 DIAGNOSIS — Z87.42 PERSONAL HISTORY OF OTHER DISEASES OF THE FEMALE GENITAL TRACT: ICD-10-CM

## 2024-03-08 DIAGNOSIS — Z12.39 ENCOUNTER FOR OTHER SCREENING FOR MALIGNANT NEOPLASM OF BREAST: ICD-10-CM

## 2024-03-08 DIAGNOSIS — M32.9 SYSTEMIC LUPUS ERYTHEMATOSUS, UNSPECIFIED: ICD-10-CM

## 2024-03-08 DIAGNOSIS — Z87.39 PERSONAL HISTORY OF OTHER DISEASES OF THE MUSCULOSKELETAL SYSTEM AND CONNECTIVE TISSUE: ICD-10-CM

## 2024-03-08 PROCEDURE — 99395 PREV VISIT EST AGE 18-39: CPT

## 2024-03-08 RX ORDER — FLUCONAZOLE 150 MG/1
150 TABLET ORAL
Qty: 2 | Refills: 2 | Status: DISCONTINUED | COMMUNITY
Start: 2024-02-20 | End: 2024-03-08

## 2024-03-08 RX ORDER — CERTOLIZUMAB PEGOL 400 MG
KIT SUBCUTANEOUS
Refills: 0 | Status: ACTIVE | COMMUNITY

## 2024-03-08 RX ORDER — CEPHALEXIN 500 MG/1
500 TABLET ORAL EVERY 8 HOURS
Qty: 21 | Refills: 0 | Status: DISCONTINUED | COMMUNITY
Start: 2024-02-20 | End: 2024-03-08

## 2024-03-08 RX ORDER — FLUCONAZOLE 150 MG/1
150 TABLET ORAL
Qty: 2 | Refills: 3 | Status: DISCONTINUED | COMMUNITY
Start: 2024-01-19 | End: 2024-03-08

## 2024-03-08 RX ORDER — SULFAMETHOXAZOLE AND TRIMETHOPRIM 800; 160 MG/1; MG/1
800-160 TABLET ORAL TWICE DAILY
Qty: 14 | Refills: 0 | Status: DISCONTINUED | COMMUNITY
Start: 2024-01-19 | End: 2024-03-08

## 2024-03-08 NOTE — PLAN
[FreeTextEntry1] : Encounter for GYN exam   - Pap obtained  - Mammogram ordered   F/U in 1 year or PRN  All questions and concerns addressed during encounter. Pt. agreed to plan of care.

## 2024-03-08 NOTE — HISTORY OF PRESENT ILLNESS
[Oral Contraceptive] : uses oral contraception pills [Y] : Positive pregnancy history [Regular Cycle Intervals] : periods have been regular [Frequency: Q ___ days] : menstrual periods occur approximately every [unfilled] days [Menarche Age: ____] : age at menarche was [unfilled] [N] : Patient does not use contraception [FreeTextEntry1] : 38 year old female presents for annual examination. Pt. is sexually active pursuing pregnancy with AL. Recently had laparotomy performed in October 2023 revealing endometriosis in the pelvis. Doing well currently, recurrent labial abscess now resolved with ABX treatment.  [PGHxTotal] : 0 [PGHxFullTerm] : 0 [PGHxPremature] : 0 [[facility] [PGHxAbortions] : 0 [PGHxABInduced] : 0 [PGHxABSpont] : 0 [PGHxEctopic] : 0 [PGHxMultBirths] : 0

## 2024-03-11 LAB
C TRACH RRNA SPEC QL NAA+PROBE: NOT DETECTED
HPV HIGH+LOW RISK DNA PNL CVX: DETECTED
N GONORRHOEA RRNA SPEC QL NAA+PROBE: NOT DETECTED
SOURCE TP AMPLIFICATION: NORMAL

## 2024-03-12 LAB — CYTOLOGY CVX/VAG DOC THIN PREP: NORMAL

## 2024-03-28 ENCOUNTER — TRANSCRIPTION ENCOUNTER (OUTPATIENT)
Age: 39
End: 2024-03-28

## 2024-04-02 ENCOUNTER — TRANSCRIPTION ENCOUNTER (OUTPATIENT)
Age: 39
End: 2024-04-02

## 2024-10-31 NOTE — ED STATDOCS - SCRIBE NAME
1121- Pt to pacu. Pt non responsive. Oral airway in place. VSS. Pt appears in no acute distress.    1129- pt wakes, follows command, oral airway removed. Pt reports pain. VSS    1139- pt reports pain improving. VSS    1146- pt resting in bed eye closed. VSS. Pt reports tolerable pain, denies nausea.     1151- pt meets criteria for discharge from pacu.     1152- pt returned to [facility] in stable condition. Spouse at bedside. Report given to Annie.   Daisy Gonzalez

## 2024-11-13 ENCOUNTER — OFFICE (OUTPATIENT)
Dept: URBAN - METROPOLITAN AREA CLINIC 115 | Facility: CLINIC | Age: 39
Setting detail: OPHTHALMOLOGY
End: 2024-11-13
Payer: COMMERCIAL

## 2024-11-13 DIAGNOSIS — H43.393: ICD-10-CM

## 2024-11-13 DIAGNOSIS — H43.813: ICD-10-CM

## 2024-11-13 PROCEDURE — 92014 COMPRE OPH EXAM EST PT 1/>: CPT | Performed by: OPTOMETRIST

## 2024-11-13 PROCEDURE — 92250 FUNDUS PHOTOGRAPHY W/I&R: CPT | Performed by: OPTOMETRIST

## 2024-11-13 PROCEDURE — 92083 EXTENDED VISUAL FIELD XM: CPT | Performed by: OPTOMETRIST

## 2024-11-13 ASSESSMENT — VISUAL ACUITY
OS_BCVA: 20/25-1
OD_BCVA: 20/20

## 2024-11-13 ASSESSMENT — REFRACTION_AUTOREFRACTION
OD_AXIS: 129
OS_AXIS: 066
OD_SPHERE: -0.25
OS_CYLINDER: -0.75
OD_CYLINDER: -0.50
OS_SPHERE: 0.00

## 2024-11-13 ASSESSMENT — LID EXAM ASSESSMENTS
OS_BLEPHARITIS: LLL T
OD_BLEPHARITIS: RLL T

## 2024-11-13 ASSESSMENT — CONFRONTATIONAL VISUAL FIELD TEST (CVF)
OD_FINDINGS: FULL
OS_FINDINGS: FULL

## 2024-11-13 ASSESSMENT — TONOMETRY
OS_IOP_MMHG: 17
OD_IOP_MMHG: 13

## 2025-01-02 ENCOUNTER — NON-APPOINTMENT (OUTPATIENT)
Age: 40
End: 2025-01-02

## 2025-01-25 ENCOUNTER — NON-APPOINTMENT (OUTPATIENT)
Age: 40
End: 2025-01-25